# Patient Record
Sex: FEMALE | Race: WHITE | Employment: UNEMPLOYED | URBAN - METROPOLITAN AREA
[De-identification: names, ages, dates, MRNs, and addresses within clinical notes are randomized per-mention and may not be internally consistent; named-entity substitution may affect disease eponyms.]

---

## 2017-01-06 ENCOUNTER — DOCUMENTATION ONLY (OUTPATIENT)
Dept: ONCOLOGY | Age: 62
End: 2017-01-06

## 2017-01-06 NOTE — PROGRESS NOTES
[employer]  Social Work Navigator Encounter     Patient Name:  Shannon Le. Vikash Luan     Medical History: hx of breast cancer    Advance Directives:    Narrative: pt still receiving treatment ; pt taking letrozole ;  SW completed Medicaid renewal form and had oncologist sign. SW provided form back to pt- SW made copy and placed in scan folder. Barriers to Care:     Plan:   1. Provide support as needed.

## 2017-02-08 ENCOUNTER — HOSPITAL ENCOUNTER (OUTPATIENT)
Dept: MAMMOGRAPHY | Age: 62
Discharge: HOME | End: 2017-02-08
Payer: MEDICAID

## 2017-02-08 DIAGNOSIS — Z12.31 ENCOUNTER FOR SCREENING MAMMOGRAM FOR BREAST CANCER: ICD-10-CM

## 2017-02-08 PROCEDURE — 77067 SCR MAMMO BI INCL CAD: CPT

## 2017-04-12 ENCOUNTER — TELEPHONE (OUTPATIENT)
Dept: ONCOLOGY | Age: 62
End: 2017-04-12

## 2017-04-12 DIAGNOSIS — N60.99 BREAST ATYPICAL HYPERPLASIA: Primary | ICD-10-CM

## 2017-04-12 NOTE — TELEPHONE ENCOUNTER
Patient called and said she received a letter in the mail from [facility] that stated she is due for another bone density scan. She would like the order faxed to them at [phone]. Their phone number is [phone].  Please give pt a call to keep her updated about what's going on, she may be reached at [phone]

## 2017-04-12 NOTE — TELEPHONE ENCOUNTER
Returned call ,HIPAA verified by two patient identifiers,   and informed patient that order for Bone scan faxed to [address] [place] [address]

## 2017-04-20 ENCOUNTER — HOSPITAL ENCOUNTER (OUTPATIENT)
Dept: NUCLEAR MEDICINE | Age: 62
Discharge: HOME | End: 2017-04-20
Attending: INTERNAL MEDICINE
Payer: MEDICAID

## 2017-04-20 DIAGNOSIS — N60.99 BREAST ATYPICAL HYPERPLASIA: ICD-10-CM

## 2017-04-20 PROCEDURE — 78306 BONE IMAGING WHOLE BODY: CPT

## 2017-08-16 DIAGNOSIS — N60.99 LOBULAR HYPERPLASIA, ATYPICAL, BREAST: ICD-10-CM

## 2017-08-16 RX ORDER — LETROZOLE 2.5 MG/1
2.5 TABLET, FILM COATED ORAL DAILY
Qty: 30 TAB | Refills: 6 | Status: SHIPPED | OUTPATIENT
Start: 2017-08-16 | End: 2018-02-26 | Stop reason: SDUPTHER

## 2017-08-16 NOTE — TELEPHONE ENCOUNTER
PER VORB from Dr. Andrea Layne LETROZOLE 2.5MG [facility]) ONE TAB ONCE A DAY QUANTITY 30 REFILL 6.

## 2017-09-21 ENCOUNTER — OFFICE VISIT (OUTPATIENT)
Dept: ONCOLOGY | Age: 62
End: 2017-09-21

## 2017-09-21 VITALS
TEMPERATURE: 98.2 F | SYSTOLIC BLOOD PRESSURE: 115 MMHG | RESPIRATION RATE: 16 BRPM | OXYGEN SATURATION: 95 % | WEIGHT: 144.8 LBS | HEART RATE: 105 BPM | DIASTOLIC BLOOD PRESSURE: 78 MMHG | BODY MASS INDEX: 24.12 KG/M2 | HEIGHT: 65 IN

## 2017-09-21 DIAGNOSIS — M81.0 OSTEOPOROSIS, UNSPECIFIED OSTEOPOROSIS TYPE, UNSPECIFIED PATHOLOGICAL FRACTURE PRESENCE: ICD-10-CM

## 2017-09-21 DIAGNOSIS — N60.99 LOBULAR HYPERPLASIA, ATYPICAL, BREAST: Primary | ICD-10-CM

## 2017-09-21 NOTE — PROGRESS NOTES
Robbie Ennis is a 62 y.o. female here today for Atypical lobular hyperplasia of the right breast f/u. Elevated pulse. Patient denies N/V. Patient stated she has not smoked in over a year. Patient showed nurse some discolored circular areas that have been showing up on her lower arms; patient states areas will develop a scab at times.

## 2017-09-22 NOTE — PROGRESS NOTES
Follow up Note        Patient: Zahida Rodríguez MRN: [id number]  SSN: [id number]    YOB: 1955  Age: 62 y.o. Sex: female        Diagnosis:     1. Atypical lobular hyperplasia of the right breast    Treatment:     1. Excisional biopsy on 2015  2. Letrozole    Subjective:      Zahida Rodríguez is a 62 y.o. female with diagnosis of Atypical lobular hyperplasia of the right breast. She underwent an excisional biopsy on 2015 which showed residual focal atypical hyperplasia. She is letrozole and is asymptomatic.           Review of Systems:    Constitutional: negative  Eyes: negative  Ears, Nose, Mouth, Throat, and Face: negative  Respiratory: negative  Cardiovascular: negative  Gastrointestinal: negative  Integument/Breast: negative  Hematologic/Lymphatic: negative  Musculoskeletal:negative  Neurological: negative        Past Medical History:   Diagnosis Date    Atypical lobular hyperplasia of breast     right breast    [facility] [address]     R 2013, L     Diabetes ([facility] [address]     BORDERLINE BEING MONITORED BY PCP    Hypertension     Nausea & vomiting      Past Surgical History:   Procedure Laterality Date    HX BREAST BIOPSY Right 2015    RIGHT BREAST EXCISIONAL BIOPSY WITH ULTRASOUND performed by Venus Hart [facility] at [facility] AMBULATORY OR    HX GYN      TUBAL LIGATION     HX HEENT  1987    RECONSTRUCTION OF NOSE    HX HEENT      EXTRACTION OF WISDOM TEETH X 4      Family History   Problem Relation Age of Onset    Breast Cancer Mother       at 40   Grimes Bogus Cancer Mother      BREAST    Heart Disease Father     No Known Problems Sister     No Known Problems Brother     No Known Problems Daughter     No Known Problems Son     Breast Cancer Maternal Aunt      Social History   Substance Use Topics    Smoking status: Former Smoker     Packs/day: 1.00     Years: 30.00     Quit date: 2016    Smokeless tobacco: Never Used      Comment: Quit Now Brochure provided  Alcohol use No      Prior to Admission medications    Medication Sig Start Date End Date Taking? Authorizing Provider   multivitamins chew Take  by mouth daily. Yes Historical Provider   letrozole [facility]) 2.5 mg tablet Take 1 Tab by mouth daily. 8/16/17  Yes Clementine Cheadle, MD   lisinopril (PRINIVIL, ZESTRIL) 20 mg tablet Take  by mouth daily. TAKES IN AM   Yes Historical Provider   CYANOCOBALAMIN, VITAMIN B-12, (VITAMIN B-12 PO) Take 1 Tab by mouth daily. Historical Provider   HYDROcodone-acetaminophen (NORCO) 5-325 mg per tablet Take 1 Tab by mouth every four (4) hours as needed for Pain. Max Daily Amount: 6 Tabs. 2/17/15   Mara Walls MD   promethazine (PHENERGAN) 12.5 mg tablet Take 1 Tab by mouth every six (6) hours as needed for Nausea. 2/17/15   Mara Walls MD              Allergies   Allergen Reactions    Sulfa (Sulfonamide Antibiotics) Swelling    Penicillins Rash    Percocet [Oxycodone-Acetaminophen] Nausea Only           Objective:     Vitals:    09/21/17 1340   BP: 115/78   Pulse: (!) 105   Resp: 16   Temp: 98.2 °F (36.8 °C)   TempSrc: Oral   SpO2: 95%   Weight: 144 lb 12.8 oz (65.7 kg)   Height: 5' 5\" (1.651 m)            Physical Exam:    GENERAL: alert, cooperative, no distress, appears older than her age  EYE: negative  LYMPHATIC: Cervical, supraclavicular, and axillary nodes normal.   THROAT & NECK: normal and no erythema or exudates noted. LUNG: clear to auscultation bilaterally  HEART: regular rate and rhythm  ABDOMEN: soft, non-tender  EXTREMITIES: no cyanosis or edema  SKIN: Normal.  NEUROLOGIC: negative            Assessment:     1. Atypical lobular hyperplasia of the right breast    S/P excisional biopsy 02/2015  On Letrozole for risk reduction  Asymptomatic      2. Osteoprosis    Zoledronic acid        Treatment:       1. Continuie Letrozole 2.5 mg/day  2. F/U in 1 yr         Signed By: Clementine Cheadle, MD     September 21, 2017           CC. Paola Burnett MD  CC.  Jean Spatz Mitzi Severs, MD

## 2018-02-15 ENCOUNTER — HOSPITAL ENCOUNTER (OUTPATIENT)
Dept: MAMMOGRAPHY | Age: 63
Discharge: HOME | End: 2018-02-15
Attending: FAMILY MEDICINE
Payer: MEDICAID

## 2018-02-15 DIAGNOSIS — Z12.31 VISIT FOR SCREENING MAMMOGRAM: ICD-10-CM

## 2018-02-15 PROCEDURE — 77067 SCR MAMMO BI INCL CAD: CPT

## 2018-02-26 DIAGNOSIS — N60.99 LOBULAR HYPERPLASIA, ATYPICAL, BREAST: ICD-10-CM

## 2018-02-26 RX ORDER — LETROZOLE 2.5 MG/1
2.5 TABLET, FILM COATED ORAL DAILY
Qty: 30 TAB | Refills: 6 | Status: SHIPPED | OUTPATIENT
Start: 2018-02-26 | End: 2018-09-27 | Stop reason: SDUPTHER

## 2018-02-26 NOTE — TELEPHONE ENCOUNTER
PER JASMINA from Dr. Monika Mccain LETROZOLE 2.5MG [facility]) ONE TAB ONCE A DAY QUANTITY 30 REFILL 6.

## 2018-05-10 RX ORDER — ZOLEDRONIC ACID 5 MG/100ML
5 INJECTION, SOLUTION INTRAVENOUS ONCE
Status: COMPLETED | OUTPATIENT
Start: 2018-05-15 | End: 2018-05-15

## 2018-05-15 ENCOUNTER — HOSPITAL ENCOUNTER (OUTPATIENT)
Dept: INFUSION THERAPY | Age: 63
Discharge: HOME | End: 2018-05-15
Payer: MEDICAID

## 2018-05-15 VITALS
WEIGHT: 137.13 LBS | HEART RATE: 84 BPM | OXYGEN SATURATION: 96 % | HEIGHT: 64 IN | RESPIRATION RATE: 16 BRPM | BODY MASS INDEX: 23.41 KG/M2 | TEMPERATURE: 98.3 F | SYSTOLIC BLOOD PRESSURE: 119 MMHG | DIASTOLIC BLOOD PRESSURE: 78 MMHG

## 2018-05-15 LAB
ANION GAP BLD CALC-SCNC: 16 MMOL/L (ref 10–20)
BUN BLD-MCNC: 9 MG/DL (ref 9–20)
CA-I BLD-MCNC: 1.18 MMOL/L (ref 1.12–1.32)
CHLORIDE BLD-SCNC: 102 MMOL/L (ref 98–107)
CO2 BLD-SCNC: 26 MMOL/L (ref 21–32)
CREAT BLD-MCNC: 0.5 MG/DL (ref 0.6–1.3)
GLUCOSE BLD-MCNC: 155 MG/DL (ref 65–100)
HCT VFR BLD CALC: 41 % (ref 35–47)
POTASSIUM BLD-SCNC: 3.7 MMOL/L (ref 3.5–5.1)
SERVICE CMNT-IMP: ABNORMAL
SODIUM BLD-SCNC: 140 MMOL/L (ref 136–145)

## 2018-05-15 PROCEDURE — 74011250636 HC RX REV CODE- 250/636: Performed by: NURSE PRACTITIONER

## 2018-05-15 PROCEDURE — 80047 BASIC METABLC PNL IONIZED CA: CPT

## 2018-05-15 PROCEDURE — 96374 THER/PROPH/DIAG INJ IV PUSH: CPT

## 2018-05-15 RX ORDER — SODIUM CHLORIDE 0.9 % (FLUSH) 0.9 %
10-40 SYRINGE (ML) INJECTION AS NEEDED
Status: ACTIVE | OUTPATIENT
Start: 2018-05-15 | End: 2018-05-16

## 2018-05-15 RX ADMIN — Medication 10 ML: at 10:41

## 2018-05-15 RX ADMIN — ZOLEDRONIC ACID 5 MG: 5 INJECTION, SOLUTION INTRAVENOUS at 10:54

## 2018-05-15 RX ADMIN — Medication 10 ML: at 11:12

## 2018-05-15 NOTE — PROGRESS NOTES
1005 Pt arrived at [place] ambulatory and in no distress for Reclast.  Assessment unremarkable, no new complaints voiced. PIV established in R arm WNL and positive for blood return. Patient Vitals for the past 12 hrs:   Temp Pulse Resp BP SpO2   05/15/18 1111 - 84 16 119/78 -   05/15/18 1011 98.3 °F (36.8 °C) 86 18 131/80 96 %       Labs:   Recent Results (from the past 12 hour(s))   POC CHEM8    Collection Time: 05/15/18 10:23 AM   Result Value Ref Range    Calcium, ionized (POC) 1.18 1.12 - 1.32 mmol/L    Sodium (POC) 140 136 - 145 mmol/L    Potassium (POC) 3.7 3.5 - 5.1 mmol/L    Chloride (POC) 102 98 - 107 mmol/L    CO2 (POC) 26 21 - 32 mmol/L    Anion gap (POC) 16 10 - 20 mmol/L    Glucose (POC) 155 (H) 65 - 100 mg/dL    BUN (POC) 9 9 - 20 mg/dL    Creatinine (POC) 0.5 (L) 0.6 - 1.3 mg/dL    GFRAA, POC >60 >60 ml/min/1.73m2    GFRNA, POC >60 >60 ml/min/1.73m2    Hematocrit (POC) 41 35.0 - 47.0 %    Comment Notified RN or MD immediately by          Medications received:  NS Flush  Reclast 5 mg IV over 15 min    1115 Tolerated treatment well, no adverse reaction noted. D/Cd from [facility] and in no distress accompanied by self.   Next appt 5/14/19 @ 10 am.

## 2018-09-17 ENCOUNTER — DOCUMENTATION ONLY (OUTPATIENT)
Dept: ONCOLOGY | Age: 63
End: 2018-09-17

## 2018-09-17 NOTE — PROGRESS NOTES
Called pt to reschedule 9/21/18 appt, as Dr Riaz Mac needs to be out of the office.   Left message for pt to return call

## 2018-09-27 ENCOUNTER — OFFICE VISIT (OUTPATIENT)
Dept: ONCOLOGY | Age: 63
End: 2018-09-27

## 2018-09-27 VITALS
HEART RATE: 79 BPM | TEMPERATURE: 98.6 F | OXYGEN SATURATION: 93 % | SYSTOLIC BLOOD PRESSURE: 117 MMHG | BODY MASS INDEX: 23.01 KG/M2 | HEIGHT: 64 IN | WEIGHT: 134.8 LBS | DIASTOLIC BLOOD PRESSURE: 81 MMHG | RESPIRATION RATE: 18 BRPM

## 2018-09-27 DIAGNOSIS — M81.0 OSTEOPOROSIS, UNSPECIFIED OSTEOPOROSIS TYPE, UNSPECIFIED PATHOLOGICAL FRACTURE PRESENCE: ICD-10-CM

## 2018-09-27 DIAGNOSIS — N60.99 ATYPICAL LOBULAR HYPERPLASIA OF BREAST: Primary | ICD-10-CM

## 2018-09-27 DIAGNOSIS — N60.99 LOBULAR HYPERPLASIA, ATYPICAL, BREAST: ICD-10-CM

## 2018-09-27 RX ORDER — LETROZOLE 2.5 MG/1
2.5 TABLET, FILM COATED ORAL DAILY
Qty: 90 TAB | Refills: 4 | Status: SHIPPED | OUTPATIENT
Start: 2018-09-27 | End: 2019-10-08 | Stop reason: SDUPTHER

## 2018-09-27 NOTE — PROGRESS NOTES
Isaak Gonzalez is a 65 y.o. female    Chief Complaint   Patient presents with    Follow-up    Medication Refill       1. Have you been to the ER, urgent care clinic since your last visit? Hospitalized since your last visit? No    2. Have you seen or consulted any other health care providers outside of the [address] [place] [address] since your last visit? Include any pap smears or colon screening.  No

## 2018-09-27 NOTE — TELEPHONE ENCOUNTER
PER JASMINA from Dr. Coronado Pacific LETROZOLE 2.5MG [facility]) ONE TAB ONCE A DAY QUANTITY 90 REFILL 4.

## 2018-10-20 NOTE — PROGRESS NOTES
Follow up Note        Patient: Alysia Cruz MRN: [id number]  SSN: [id number]    YOB: 1955  Age: 65 y.o. Sex: female        Diagnosis:     1. Atypical lobular hyperplasia of the right breast    Treatment:     1. Excisional biopsy on 2015  2. Letrozole    Subjective:      Alysia Cruz is a 65 y.o. female with diagnosis of Atypical lobular hyperplasia of the right breast. She underwent an excisional biopsy on 2015 which showed residual focal atypical hyperplasia. She is letrozole and is asymptomatic.           Review of Systems:    Constitutional: negative  Eyes: negative  Ears, Nose, Mouth, Throat, and Face: negative  Respiratory: negative  Cardiovascular: negative  Gastrointestinal: negative  Integument/Breast: negative  Hematologic/Lymphatic: negative  Musculoskeletal:negative  Neurological: negative        Past Medical History:   Diagnosis Date    Atypical lobular hyperplasia of breast     right breast    Cancer ([facility] [address]     R 2013, L     Diabetes ([facility] [address]     BORDERLINE BEING MONITORED BY PCP    Hypertension     Nausea & vomiting      Past Surgical History:   Procedure Laterality Date    HX GYN      TUBAL LIGATION     HX HEENT  1987    RECONSTRUCTION OF NOSE    HX HEENT      EXTRACTION OF WISDOM TEETH X 4      Family History   Problem Relation Age of Onset    Breast Cancer Mother          at 40   [facility] Cancer Mother         BREAST    Heart Disease Father     No Known Problems Sister     No Known Problems Brother     No Known Problems Daughter     No Known Problems Son     Breast Cancer Maternal Aunt      Social History     Tobacco Use    Smoking status: Former Smoker     Packs/day: 1.00     Years: 30.00     Pack years: 30.00     Last attempt to quit: 2016     Years since quittin.0    Smokeless tobacco: Never Used    Tobacco comment: Quit Now Brochure provided   Substance Use Topics    Alcohol use: No      Prior to Admission medications    Medication Sig Start Date End Date Taking? Authorizing Provider   multivitamins chew Take  by mouth daily. Yes Provider, Historical   lisinopril (PRINIVIL, ZESTRIL) 20 mg tablet Take  by mouth daily. TAKES IN AM   Yes Provider, Historical   letrozole (FEMARA) 2.5 mg tablet Take 1 Tab by mouth daily. 9/27/18   lEoy Ellison MD   CYANOCOBALAMIN, VITAMIN B-12, (VITAMIN B-12 PO) Take 1 Tab by mouth daily. Provider, Historical   HYDROcodone-acetaminophen (NORCO) 5-325 mg per tablet Take 1 Tab by mouth every four (4) hours as needed for Pain. Max Daily Amount: 6 Tabs. 2/17/15   Vergia Mortimer, MD   promethazine (PHENERGAN) 12.5 mg tablet Take 1 Tab by mouth every six (6) hours as needed for Nausea. 2/17/15   Vergia Mortimer, MD              Allergies   Allergen Reactions    Sulfa (Sulfonamide Antibiotics) Swelling    Penicillins Rash    Percocet [Oxycodone-Acetaminophen] Nausea Only           Objective:     Vitals:    09/27/18 1517   BP: 117/81   Pulse: 79   Resp: 18   Temp: 98.6 °F (37 °C)   TempSrc: Oral   SpO2: 93%   Weight: 134 lb 12.8 oz (61.1 kg)   Height: 5' 4\" (1.626 m)            Physical Exam:    GENERAL: alert, cooperative, no distress, appears older than her age  EYE: negative  LYMPHATIC: Cervical, supraclavicular, and axillary nodes normal.   THROAT & NECK: normal and no erythema or exudates noted. LUNG: clear to auscultation bilaterally  HEART: regular rate and rhythm  ABDOMEN: soft, non-tender  EXTREMITIES: no cyanosis or edema  SKIN: Normal.  NEUROLOGIC: negative            Assessment:     1. Atypical lobular hyperplasia of the right breast    S/P excisional biopsy 02/2015  On Letrozole for risk reduction  Asymptomatic      2. Osteoprosis    Zoledronic acid        Treatment:       1. Continuie Letrozole 2.5 mg/day  2. F/U in 1 yr         Signed By: Giovana Yeboah MD     October 20, 2018           CC. Marshall Adler MD  CC.  Jeremy Barker MD

## 2019-01-07 ENCOUNTER — HOSPITAL ENCOUNTER (OUTPATIENT)
Dept: ULTRASOUND IMAGING | Age: 64
Discharge: HOME | End: 2019-01-07
Attending: ORTHOPAEDIC SURGERY
Payer: MEDICAID

## 2019-01-07 DIAGNOSIS — M62.432 CONTRACTURE OF MUSCLE OF LEFT FOREARM: ICD-10-CM

## 2019-01-07 PROCEDURE — 76882 US LMTD JT/FCL EVL NVASC XTR: CPT

## 2019-01-25 ENCOUNTER — TELEPHONE (OUTPATIENT)
Dept: ONCOLOGY | Age: 64
End: 2019-01-25

## 2019-02-19 ENCOUNTER — HOSPITAL ENCOUNTER (OUTPATIENT)
Dept: MAMMOGRAPHY | Age: 64
Discharge: HOME | End: 2019-02-19
Attending: FAMILY MEDICINE
Payer: MEDICAID

## 2019-02-19 DIAGNOSIS — Z12.31 VISIT FOR SCREENING MAMMOGRAM: ICD-10-CM

## 2019-02-19 PROCEDURE — 77067 SCR MAMMO BI INCL CAD: CPT

## 2019-05-14 ENCOUNTER — HOSPITAL ENCOUNTER (OUTPATIENT)
Dept: INFUSION THERAPY | Age: 64
Discharge: HOME | End: 2019-05-14
Payer: MEDICAID

## 2019-05-14 VITALS
WEIGHT: 140.7 LBS | RESPIRATION RATE: 16 BRPM | HEIGHT: 64 IN | DIASTOLIC BLOOD PRESSURE: 80 MMHG | HEART RATE: 67 BPM | TEMPERATURE: 98.7 F | SYSTOLIC BLOOD PRESSURE: 134 MMHG | BODY MASS INDEX: 24.02 KG/M2 | OXYGEN SATURATION: 94 %

## 2019-05-14 DIAGNOSIS — M81.0 OSTEOPOROSIS, UNSPECIFIED OSTEOPOROSIS TYPE, UNSPECIFIED PATHOLOGICAL FRACTURE PRESENCE: Primary | ICD-10-CM

## 2019-05-14 PROBLEM — M85.80 OSTEOPENIA: Status: ACTIVE | Noted: 2019-05-14

## 2019-05-14 LAB
ANION GAP BLD CALC-SCNC: 16 MMOL/L (ref 10–20)
BUN BLD-MCNC: 12 MG/DL (ref 9–20)
CA-I BLD-MCNC: 1.31 MMOL/L (ref 1.12–1.32)
CHLORIDE BLD-SCNC: 100 MMOL/L (ref 98–107)
CO2 BLD-SCNC: 27 MMOL/L (ref 21–32)
CREAT BLD-MCNC: 0.6 MG/DL (ref 0.6–1.3)
GLUCOSE BLD-MCNC: 100 MG/DL (ref 65–100)
HCT VFR BLD CALC: 46 % (ref 35–47)
POTASSIUM BLD-SCNC: 3.7 MMOL/L (ref 3.5–5.1)
SERVICE CMNT-IMP: NORMAL
SODIUM BLD-SCNC: 139 MMOL/L (ref 136–145)

## 2019-05-14 PROCEDURE — 74011250636 HC RX REV CODE- 250/636: Performed by: NURSE PRACTITIONER

## 2019-05-14 PROCEDURE — 80047 BASIC METABLC PNL IONIZED CA: CPT

## 2019-05-14 PROCEDURE — 74011000258 HC RX REV CODE- 258: Performed by: INTERNAL MEDICINE

## 2019-05-14 PROCEDURE — 96374 THER/PROPH/DIAG INJ IV PUSH: CPT

## 2019-05-14 RX ORDER — ACETAMINOPHEN 325 MG/1
650 TABLET ORAL AS NEEDED
Status: CANCELLED
Start: 2019-05-14

## 2019-05-14 RX ORDER — DIPHENHYDRAMINE HYDROCHLORIDE 50 MG/ML
50 INJECTION, SOLUTION INTRAMUSCULAR; INTRAVENOUS AS NEEDED
Status: CANCELLED
Start: 2019-05-14

## 2019-05-14 RX ORDER — EPINEPHRINE 1 MG/ML
0.3 INJECTION, SOLUTION, CONCENTRATE INTRAVENOUS AS NEEDED
Status: CANCELLED | OUTPATIENT
Start: 2019-05-14

## 2019-05-14 RX ORDER — SODIUM CHLORIDE 9 MG/ML
10 INJECTION INTRAMUSCULAR; INTRAVENOUS; SUBCUTANEOUS AS NEEDED
Status: ACTIVE | OUTPATIENT
Start: 2019-05-14 | End: 2019-05-14

## 2019-05-14 RX ORDER — SODIUM CHLORIDE 9 MG/ML
25 INJECTION, SOLUTION INTRAVENOUS AS NEEDED
Status: DISCONTINUED | OUTPATIENT
Start: 2019-05-14 | End: 2019-05-15 | Stop reason: HOSPADM

## 2019-05-14 RX ORDER — ONDANSETRON 2 MG/ML
8 INJECTION INTRAMUSCULAR; INTRAVENOUS AS NEEDED
Status: CANCELLED | OUTPATIENT
Start: 2019-05-14

## 2019-05-14 RX ORDER — ZOLEDRONIC ACID 5 MG/100ML
5 INJECTION, SOLUTION INTRAVENOUS ONCE
Status: COMPLETED | OUTPATIENT
Start: 2019-05-14 | End: 2019-05-14

## 2019-05-14 RX ORDER — HEPARIN 100 UNIT/ML
300-500 SYRINGE INTRAVENOUS AS NEEDED
Status: CANCELLED
Start: 2019-05-14

## 2019-05-14 RX ORDER — SODIUM CHLORIDE 0.9 % (FLUSH) 0.9 %
10-40 SYRINGE (ML) INJECTION AS NEEDED
Status: DISCONTINUED | OUTPATIENT
Start: 2019-05-14 | End: 2019-05-15 | Stop reason: HOSPADM

## 2019-05-14 RX ORDER — SODIUM CHLORIDE 9 MG/ML
25 INJECTION, SOLUTION INTRAVENOUS CONTINUOUS
Status: DISPENSED | OUTPATIENT
Start: 2019-05-14 | End: 2019-05-14

## 2019-05-14 RX ORDER — SODIUM CHLORIDE 9 MG/ML
10 INJECTION INTRAMUSCULAR; INTRAVENOUS; SUBCUTANEOUS AS NEEDED
Status: CANCELLED | OUTPATIENT
Start: 2019-05-14

## 2019-05-14 RX ORDER — HEPARIN 100 UNIT/ML
300-500 SYRINGE INTRAVENOUS AS NEEDED
Status: ACTIVE | OUTPATIENT
Start: 2019-05-14 | End: 2019-05-14

## 2019-05-14 RX ORDER — SODIUM CHLORIDE 0.9 % (FLUSH) 0.9 %
10 SYRINGE (ML) INJECTION AS NEEDED
Status: ACTIVE | OUTPATIENT
Start: 2019-05-14 | End: 2019-05-14

## 2019-05-14 RX ORDER — ALBUTEROL SULFATE 0.83 MG/ML
2.5 SOLUTION RESPIRATORY (INHALATION) AS NEEDED
Status: CANCELLED
Start: 2019-05-14

## 2019-05-14 RX ORDER — SODIUM CHLORIDE 9 MG/ML
25 INJECTION, SOLUTION INTRAVENOUS CONTINUOUS
Status: CANCELLED | OUTPATIENT
Start: 2019-05-14

## 2019-05-14 RX ORDER — SODIUM CHLORIDE 0.9 % (FLUSH) 0.9 %
10 SYRINGE (ML) INJECTION AS NEEDED
Status: CANCELLED
Start: 2019-05-14

## 2019-05-14 RX ORDER — HYDROCORTISONE SODIUM SUCCINATE 100 MG/2ML
100 INJECTION, POWDER, FOR SOLUTION INTRAMUSCULAR; INTRAVENOUS AS NEEDED
Status: CANCELLED | OUTPATIENT
Start: 2019-05-14

## 2019-05-14 RX ADMIN — SODIUM CHLORIDE 25 ML/HR: 900 INJECTION, SOLUTION INTRAVENOUS at 10:25

## 2019-05-14 RX ADMIN — Medication 10 ML: at 10:16

## 2019-05-14 RX ADMIN — ZOLEDRONIC ACID 5 MG: 5 INJECTION, SOLUTION INTRAVENOUS at 10:27

## 2019-05-14 RX ADMIN — Medication 10 ML: at 10:48

## 2019-05-14 NOTE — PROGRESS NOTES
2019 Pt arrived at [facility] ambulatory and in no distress for Reclast.  Assessment unremarkable, no new complaints voiced. Patient Vitals for the past 12 hrs:   Temp Pulse Resp BP SpO2   05/14/19 1049 -- 67 16 134/80 --   05/14/19 0956 98.7 °F (37.1 °C) 78 18 148/88 94 %       Medications received:  NS KVO  Reclast 5 mg IV over 15 min  NS Flush    1055 Tolerated treatment well, no adverse reaction noted. D/Cd from [facility] ambulatory and in no distress accompanied by Self. Next appointment will be after pt follows up with physician in a year.

## 2019-05-14 NOTE — DISCHARGE INSTRUCTIONS
OUTPATIENT INFUSION CENTER    DISCHARGE INSTRUCTIONS FOR:  RECLAST (ZOLEDRONIC ACID):    1.  Be sure to inform your Dentist that you are taking this drug prior to invasive dental procedures. You should avoid major dental work for a while after you are treated with this medicine. Report any signs/symptoms of jaw pain to your physician immediately. 2.  Your doctor may order lab testing before each yearly dose of Reclast to check your calcium and kidney function. Your doctor may also prescribe Vitamin D and Calcium supplements. 3.  Make sure your doctor knows if you are taking fluid pills, arthritis medicines or antibiotics,  or if you have a history of problems with your gums, mouth or teeth. 4.  Drink some extra fluids during the 12-hour period before and after you receive Reclast.  Report any changes in urinary patterns (example: a decrease in how often you urinate). Also report rapid weight gain, swelling of the hands, ankles or feet, unusual tiredness or weakness or unusual bleeding or bruising. 5.  You may resume your normal activities after your infusion. Some people may have mild flu-like side effects from Reclast, especially with the first dose. These side effects are less common with subsequent doses. These may include:    Mild nausea, diarrhea, constipation or upset stomach; Red or irritated eyes; redness or itching at IV site;  Mild skin rash, mild numbness, tingling, or burning in extremities; Headache, dizziness, trouble sleeping, or mild muscle or joint pain, which can be relieved with a mild pain reliever such as Tyenol or Ibuprofen as directed by your physician;  Nasal congestion, runny nose, sneezing. 6.  Signs/Symptoms of an allergic reaction may require immediate medical attention. These are rare, but may include one or more of the following:     Skin - Swelling, blistering, weeping, crusting, rash, itching or;   Wheezing, cough, sore throat, chest tightness, chest pain or shortness of breath, irregular heart beat;  Swelling of the face, eyelids, lips, tongue, or throat; severe dry mouth; Severe red (bloodshot), itchy, swollen, watery or painful eyes;  Stomach pain, loss of appetite, nausea, vomiting, or bloody diarrhea;  Severe headache, sleepiness or changes in personality;  Numbness, tingling or pain around the mouth, teeth, or jaw. Contact your physician if you have questions or concerns, or experience any of the above symptoms.     Deborah Vazquez, Signature: ______________________________ 5/14/2019  Michele Gregory RN

## 2019-09-27 ENCOUNTER — OFFICE VISIT (OUTPATIENT)
Dept: ONCOLOGY | Age: 64
End: 2019-09-27

## 2019-09-27 VITALS
TEMPERATURE: 98.2 F | BODY MASS INDEX: 23.63 KG/M2 | HEART RATE: 82 BPM | DIASTOLIC BLOOD PRESSURE: 77 MMHG | HEIGHT: 64 IN | WEIGHT: 138.4 LBS | OXYGEN SATURATION: 94 % | RESPIRATION RATE: 16 BRPM | SYSTOLIC BLOOD PRESSURE: 135 MMHG

## 2019-09-27 DIAGNOSIS — N60.99 LOBULAR HYPERPLASIA, ATYPICAL, BREAST: Primary | ICD-10-CM

## 2019-09-27 DIAGNOSIS — M81.8 OTHER OSTEOPOROSIS WITHOUT CURRENT PATHOLOGICAL FRACTURE: ICD-10-CM

## 2019-09-27 DIAGNOSIS — Z79.811 AROMATASE INHIBITOR USE: ICD-10-CM

## 2019-09-27 NOTE — PROGRESS NOTES
58 y/o cauc female here for f/u appt for breast ca, right, is letrozole as of 2/2015. Zometa 5/2019.    1. Have you been to the ER, urgent care clinic since your last visit? Hospitalized since your last visit? No    2. Have you seen or consulted any other health care providers outside of the [address] [place] [address] since your last visit? Include any pap smears or colon screening.  Yes PCP for BP medication refill

## 2019-09-27 NOTE — PROGRESS NOTES
[facility]  [address], [address] [place] [address] Chad Cárdenasu, [address] [facility]  [phone]      Follow up Note        Patient: Stone Katz MRN: [id number]  SSN: [id number]    YOB: 1955  Age: 59 y.o. Sex: female        Diagnosis:     1. Atypical lobular hyperplasia of the right breast Dx: 2015    Treatment:     1. Excisional biopsy on 2015  2. Letrozole 2.5 mg - started 3/2015    Subjective:      Stone Katz is a 59 y.o. female with diagnosis of Atypical lobular hyperplasia of the right breast. She underwent an excisional biopsy on 2015 which showed residual focal atypical hyperplasia. She is taking letrozole and is asymptomatic.         Review of Systems:    Constitutional: negative  Eyes: negative  Ears, Nose, Mouth, Throat, and Face: negative  Respiratory: negative  Cardiovascular: negative  Gastrointestinal: negative  Integument/Breast: negative  Hematologic/Lymphatic: negative  Musculoskeletal:negative  Neurological: negative        Past Medical History:   Diagnosis Date    Atypical lobular hyperplasia of breast     right breast    Cancer ([facility] [address]     R 2013, L     Diabetes ([facility] [address]     BORDERLINE BEING MONITORED BY PCP    Hypertension     Nausea & vomiting      Past Surgical History:   Procedure Laterality Date    HX BREAST BIOPSY Right 2015    ADH    HX GYN      TUBAL LIGATION     HX HEENT  1987    RECONSTRUCTION OF NOSE    HX HEENT      EXTRACTION OF WISDOM TEETH X 4      Family History   Problem Relation Age of Onset    Breast Cancer Mother          at 40   [place] Cancer Mother         BREAST    Heart Disease Father     No Known Problems Sister     No Known Problems Brother     No Known Problems Daughter     No Known Problems Son     Breast Cancer Maternal Aunt      Social History     Tobacco Use    Smoking status: Former Smoker     Packs/day: 1.00     Years: 30.00 Pack years: 30.00     Last attempt to quit: 9/21/2016     Years since quitting: 3.0    Smokeless tobacco: Never Used    Tobacco comment: Quit Now Brochure provided   Substance Use Topics    Alcohol use: No      Prior to Admission medications    Medication Sig Start Date End Date Taking? Authorizing Provider   letrozole [facility]) 2.5 mg tablet Take 1 Tab by mouth daily. 9/27/18  Yes Raymon Dakin, MD   multivitamins chew Take  by mouth daily. Yes Provider, Historical   lisinopril (PRINIVIL, ZESTRIL) 20 mg tablet Take  by mouth daily. TAKES IN AM   Yes Provider, Historical   CYANOCOBALAMIN, VITAMIN B-12, (VITAMIN B-12 PO) Take 1 Tab by mouth daily. Provider, Historical   HYDROcodone-acetaminophen (NORCO) 5-325 mg per tablet Take 1 Tab by mouth every four (4) hours as needed for Pain. Max Daily Amount: 6 Tabs. 2/17/15   Mark Tiwari MD   promethazine (PHENERGAN) 12.5 mg tablet Take 1 Tab by mouth every six (6) hours as needed for Nausea. 2/17/15   Mark Tiwari MD              Allergies   Allergen Reactions    Sulfa (Sulfonamide Antibiotics) Swelling    Penicillins Rash    Percocet [Oxycodone-Acetaminophen] Nausea Only           Objective:     Vitals:    09/27/19 1058   BP: 135/77   Pulse: 82   Resp: 16   Temp: 98.2 °F (36.8 °C)   TempSrc: Oral   SpO2: 94%   Weight: 138 lb 6.4 oz (62.8 kg)   Height: 5' 4\" (1.626 m)            Physical Exam:    GENERAL: alert, cooperative, no distress, appears older than her age  EYE: negative  LYMPHATIC: Cervical, supraclavicular, and axillary nodes normal.   THROAT & NECK: normal and no erythema or exudates noted. LUNG: clear to auscultation bilaterally  HEART: regular rate and rhythm  ABDOMEN: soft, non-tender  EXTREMITIES: no cyanosis or edema  SKIN: Normal.  NEUROLOGIC: negative            Assessment:     1.  Atypical lobular hyperplasia of the right breast    S/P excisional biopsy 02/2015  On Letrozole for risk reduction  Asymptomatic    Last mammogram 2/19/2019 - no evidence of malignancy      2. Osteoprosis    Zoledronic acid      Treatment:       1. Continuie Letrozole 2.5 mg/day  2. Continue Zoledronic acid  3. F/U in 1 yr       Signed by: Lloyd Lanier MD                     September 28, 2019        CC. Emma Simons MD  CC.  Purnima Kay MD

## 2019-12-16 ENCOUNTER — TELEPHONE (OUTPATIENT)
Dept: ONCOLOGY | Age: 64
End: 2019-12-16

## 2020-02-20 ENCOUNTER — HOSPITAL ENCOUNTER (OUTPATIENT)
Dept: MAMMOGRAPHY | Age: 65
Discharge: HOME | End: 2020-02-20
Attending: FAMILY MEDICINE
Payer: MEDICAID

## 2020-02-20 DIAGNOSIS — Z12.31 VISIT FOR SCREENING MAMMOGRAM: ICD-10-CM

## 2020-02-20 PROCEDURE — 77067 SCR MAMMO BI INCL CAD: CPT

## 2020-09-28 ENCOUNTER — OFFICE VISIT (OUTPATIENT)
Dept: ONCOLOGY | Age: 65
End: 2020-09-28
Payer: MEDICAID

## 2020-09-28 VITALS
DIASTOLIC BLOOD PRESSURE: 77 MMHG | SYSTOLIC BLOOD PRESSURE: 127 MMHG | BODY MASS INDEX: 23.66 KG/M2 | HEIGHT: 64 IN | WEIGHT: 138.6 LBS | OXYGEN SATURATION: 91 % | TEMPERATURE: 98.1 F | HEART RATE: 82 BPM

## 2020-09-28 DIAGNOSIS — Z79.811 AROMATASE INHIBITOR USE: ICD-10-CM

## 2020-09-28 DIAGNOSIS — N60.99 ATYPICAL LOBULAR HYPERPLASIA OF BREAST: Primary | ICD-10-CM

## 2020-09-28 PROCEDURE — 99213 OFFICE O/P EST LOW 20 MIN: CPT | Performed by: INTERNAL MEDICINE

## 2020-09-28 NOTE — PROGRESS NOTES
Salma Nichols is a 62 y.o. female here for follow up for:  Chief Complaint   Patient presents with    Follow-up     lobular hyperplasia of right breast   Pt on letrozole    1. Have you been to the ER, urgent care clinic since your last visit? Hospitalized since your last visit? no    2. Have you seen or consulted any other health care providers outside of the [address] [place] [address] since your last visit? Include any pap smears or colon screening. Dermatologist in February    Pt has not taken Letrozole for a few months. Did not call in to ask for refill. Thought she might get taken off. She had a basal spot removed from her left arm in February. Has not had Zometa in over one year.

## 2020-09-28 NOTE — LETTER
9/28/20 Patient: Wilmar Robbins YOB: 1955 Date [facility]: [facility]28/[facility] [address] VIA Facsimile: [phone] Dear Fuentes Lawrence MD, Thank you for referring Ms. Jose Espinoza to [address] [place] [address] for evaluation. My notes for this consultation are attached. If you have questions, please do not hesitate to call me. I look forward to following your patient along with you.  
 
 
Sincerely, 
 
Piper Mckeon MD

## 2020-09-28 NOTE — PROGRESS NOTES
[facility]  [address] [facility], [address] [place] [address] Chad Moraes, [address] [facility]  [phone]      Follow up Note        Patient: Vinh Carlson MRN: [id number]  SSN: [id number]    YOB: 1955  Age: 62 y.o. Sex: female        Diagnosis:     1. Atypical lobular hyperplasia of the right breast Dx: 2015    Treatment:     1. Excisional biopsy on 2015  2. Letrozole 2.5 mg - started 3/2015 - stop 2020 completed 5 years    Subjective:      Vinh Carlson is a 62 y.o. female with diagnosis of Atypical lobular hyperplasia of the right breast. She underwent an excisional biopsy on 2015 which showed residual focal atypical hyperplasia. She recently stopped her letrozole. Mammograms up to date.      Review of Systems:    Constitutional: negative  Eyes: negative  Ears, Nose, Mouth, Throat, and Face: negative  Respiratory: negative  Cardiovascular: negative  Gastrointestinal: negative  Integument/Breast: negative  Hematologic/Lymphatic: negative  Musculoskeletal:negative  Neurological: negative        Past Medical History:   Diagnosis Date    Atypical lobular hyperplasia of breast     right breast    Cancer ([address]     R 2013, L     Diabetes ([address]     BORDERLINE BEING MONITORED BY PCP    Hypertension     Nausea & vomiting      Past Surgical History:   Procedure Laterality Date    HX BREAST BIOPSY Right 2015    ADH    HX GYN      TUBAL LIGATION     HX HEENT  1987    RECONSTRUCTION OF NOSE    HX HEENT      EXTRACTION OF WISDOM TEETH X 4      Family History   Problem Relation Age of Onset    Breast Cancer Mother          at 40   Fidelia Abbot Cancer Mother         BREAST    Heart Disease Father     No Known Problems Sister     No Known Problems Brother     No Known Problems Daughter     No Known Problems Son     Breast Cancer Maternal Aunt      Social History     Tobacco Use    Smoking status: Former Smoker     Packs/day: 1.00     Years: 30.00     Pack years: 30.00     Last attempt to quit: 2016     Years since quittin.0    Smokeless tobacco: Never Used    Tobacco comment: Quit Now Brochure provided   Substance Use Topics    Alcohol use: No      Prior to Admission medications    Medication Sig Start Date End Date Taking? Authorizing Provider   letrozole [facility]) 2.5 mg tablet TAKE ONE TABLET BY MOUTH DAILY 10/15/19  Yes Az Johnson MD   multivitamins chew Take  by mouth daily. Yes Provider, Historical   CYANOCOBALAMIN, VITAMIN B-12, (VITAMIN B-12 PO) Take 1 Tab by mouth daily. Yes Provider, Historical   lisinopril (PRINIVIL, ZESTRIL) 20 mg tablet Take  by mouth daily. TAKES IN AM   Yes Provider, Historical   HYDROcodone-acetaminophen (NORCO) 5-325 mg per tablet Take 1 Tab by mouth every four (4) hours as needed for Pain. Max Daily Amount: 6 Tabs. 2/17/15   Meek Manjarrez MD   promethazine (PHENERGAN) 12.5 mg tablet Take 1 Tab by mouth every six (6) hours as needed for Nausea. 2/17/15   Meek Manjarrez MD          Allergies   Allergen Reactions    Sulfa (Sulfonamide Antibiotics) Swelling    Penicillins Rash    Percocet [Oxycodone-Acetaminophen] Nausea Only           Objective:     Vitals:    20 1121   BP: 127/77   Pulse: 82   Temp: 98.1 °F (36.7 °C)   TempSrc: Oral   SpO2: 91%   Weight: 138 lb 9.6 oz (62.9 kg)   Height: 5' 4\" (1.626 m)        Pain Scale: 0 - No pain/10      Physical Exam:    GENERAL: alert, cooperative, no distress, appears older than her age  EYE: negative  LYMPHATIC: Cervical, supraclavicular, and axillary nodes normal.   THROAT & NECK: normal and no erythema or exudates noted. LUNG: clear to auscultation bilaterally  HEART: regular rate and rhythm  ABDOMEN: soft, non-tender  EXTREMITIES: no cyanosis or edema  SKIN: Normal.  NEUROLOGIC: negative      Assessment:     1.  Atypical lobular hyperplasia of the right breast    S/P excisional biopsy 2015  On Letrozole for risk reduction - may discontinue. She completed 5 years  Asymptomatic    Last mammogram 2/20/2020 - no evidence of malignancy      2. Osteoprosis    Zoledronic acid 2015 - 2019      Treatment:       1. Discontinue Letrozole 2.5 mg/day - completed 5 years  2. Mammogram in February 2020  3. Return as needed   4. F/U with Dr. Fredi Rodriguez      Signed by: Piper Mckeon MD                     September 28, 2020        CC. Keira Gomez MD  CC.  Adam Santos MD

## 2020-10-07 ENCOUNTER — OFFICE VISIT (OUTPATIENT)
Dept: SURGERY | Age: 65
End: 2020-10-07
Payer: MEDICAID

## 2020-10-07 VITALS
TEMPERATURE: 98.9 F | BODY MASS INDEX: 23.56 KG/M2 | WEIGHT: 138 LBS | SYSTOLIC BLOOD PRESSURE: 126 MMHG | HEART RATE: 87 BPM | HEIGHT: 64 IN | DIASTOLIC BLOOD PRESSURE: 60 MMHG

## 2020-10-07 DIAGNOSIS — Z91.89 AT HIGH RISK FOR BREAST CANCER: Primary | ICD-10-CM

## 2020-10-07 PROCEDURE — 99202 OFFICE O/P NEW SF 15 MIN: CPT | Performed by: SURGERY

## 2020-10-07 NOTE — LETTER
10/9/20 Patient: Ligia Figueroa YOB: 1955 Date of Visit: 10/7/2020 Lety Oakes [phone] [id number] VIA Facsimile: [phone] Dear Francis Serrato MD, Thank you for referring Ms. Lilli Parham to [address] [place] [address] for evaluation. My notes for this consultation are attached. If you have questions, please do not hesitate to call me. I look forward to following your patient along with you. Sincerely, Rafat Martínez MD

## 2020-10-07 NOTE — PATIENT INSTRUCTIONS

## 2020-10-07 NOTE — PROGRESS NOTES
HISTORY OF PRESENT ILLNESS  Josh Nguyen is a 62 y.o. female. HPI ESTABLISHED patient from 2015 here for bilateral breast pain. Breast history-  She has a history of ALH of RIGHT breast and had excisional biopsy in 2/2015. Completed 5 years of Letrozole, per Dr. Karen Corona, for risk reduction. She had no problems with this. BRCA test from 02/25/15 was negative. Breast imaging-  [facility] Results (most recent):  Results from Hospital Encounter encounter on 02/20/20   [facility] MAMMO BI SCREENING INCL CAD    Narrative STUDY: Bilateral digital screening mammogram    INDICATION:  Screening. COMPARISON: Prior studies dating back to 2012    BREAST COMPOSITION:  There are scattered areas of fibroglandular density. FINDINGS: Bilateral digital screening mammography was performed and is  interpreted in conjunction with a computer assisted detection (CAD) system. No  suspicious masses or calcifications are identified. There has been no  significant change. Impression IMPRESSION:  BI-RADS 1: Negative. No mammographic evidence of malignancy. RECOMMENDATIONS:  Next screening mammogram is recommended in one year. The patient will be notified of these results. Review of Systems   All other systems reviewed and are negative. Physical Exam  Chest:      Breasts: Breasts are symmetrical.         Right: Tenderness present. No inverted nipple, mass, nipple discharge or skin change. Left: Tenderness present. No inverted nipple, mass, nipple discharge or skin change. Lymphadenopathy:      Cervical: No cervical adenopathy. ASSESSMENT and PLAN    ICD-10-CM ICD-9-CM    1. At high risk for breast cancer  Z91.89 V49.89      - normal exam and imaging.    Yearly mammo and breast mri  F/u in 1 year with np

## 2021-03-17 ENCOUNTER — HOSPITAL ENCOUNTER (OUTPATIENT)
Dept: MAMMOGRAPHY | Age: 66
Discharge: HOME | End: 2021-03-17
Attending: INTERNAL MEDICINE
Payer: MEDICAID

## 2021-03-17 DIAGNOSIS — Z79.811 AROMATASE INHIBITOR USE: ICD-10-CM

## 2021-03-17 DIAGNOSIS — N60.99 ATYPICAL LOBULAR HYPERPLASIA OF BREAST: ICD-10-CM

## 2021-03-17 PROCEDURE — 77067 SCR MAMMO BI INCL CAD: CPT

## 2021-10-11 ENCOUNTER — OFFICE VISIT (OUTPATIENT)
Dept: SURGERY | Age: 66
End: 2021-10-11
Payer: MEDICAID

## 2021-10-11 VITALS
SYSTOLIC BLOOD PRESSURE: 134 MMHG | WEIGHT: 138 LBS | DIASTOLIC BLOOD PRESSURE: 77 MMHG | HEIGHT: 64 IN | HEART RATE: 106 BPM | BODY MASS INDEX: 23.56 KG/M2

## 2021-10-11 DIAGNOSIS — N60.12 FIBROCYSTIC BREAST CHANGES OF BOTH BREASTS: Primary | ICD-10-CM

## 2021-10-11 DIAGNOSIS — N60.11 FIBROCYSTIC BREAST CHANGES OF BOTH BREASTS: Primary | ICD-10-CM

## 2021-10-11 PROCEDURE — 99213 OFFICE O/P EST LOW 20 MIN: CPT | Performed by: NURSE PRACTITIONER

## 2021-10-11 NOTE — PATIENT INSTRUCTIONS

## 2021-10-11 NOTE — PROGRESS NOTES
HISTORY OF PRESENT ILLNESS  Isa Jaffe is a 60 y.o. female. HPI Established patient presents for follow-up to RIGHT breast ALH. Denies breast mass, skin changes, nipple discharge and pain. Breast history -  Referring - [facility]  She has a history of ALH of RIGHT breast and had excisional biopsy in 2015. Completed 5 years of Letrozole, per Dr. Zulma Alcantara, for risk reduction. Family history -   Mother  from breast cancer at age 40. No family history of ovarian cancer. Patient does not know her father's family history. BRCA test from 02/25/15 was negative. OB History        3    Para   3    Term                AB        Living           SAB        TAB        Ectopic        Molar        Multiple        Live Births              Obstetric Comments   Menarche:  16. LMP: 53.  # of Children:  3. Age at Delivery of First Child:  17.   Hysterectomy/oophorectomy:  NO/NO. Breast Bx:  no.  Hx of Breast Feeding:  no. BCP:  yes. Hormone therapy:  no.               Past Surgical History:   Procedure Laterality Date    HX BREAST BIOPSY Right 2015    ADH    HX GYN      TUBAL LIGATION     HX HEENT  1987    RECONSTRUCTION OF NOSE    HX HEENT      EXTRACTION OF WISDOM TEETH X 4         [facility] Results (most recent):  Results from Hospital Encounter encounter on 21    [facility] MAMMO BI SCREENING INCL CAD    Narrative  STUDY: Bilateral digital screening mammogram    INDICATION:  Screening. COMPARISON: Priors dating back to 2017    BREAST COMPOSITION:  There are scattered areas of fibroglandular density. FINDINGS: Bilateral digital screening mammography was performed and is  interpreted in conjunction with a computer assisted detection (CAD) system. No  suspicious masses or calcifications are identified. There has been no  significant change. Impression  BI-RADS 1: Negative. No mammographic evidence of malignancy.     RECOMMENDATIONS:  Next screening mammogram is recommended in one year. The patient will be notified of these results. ROS       Physical Exam  Constitutional:       Appearance: Normal appearance. Chest:      Breasts:         Right: No mass, nipple discharge, skin change or tenderness. Left: No mass, nipple discharge, skin change or tenderness. Musculoskeletal:      Comments: FROM - UE x 2   Lymphadenopathy:      Upper Body:      Right upper body: No supraclavicular or axillary adenopathy. Left upper body: No supraclavicular or axillary adenopathy. Neurological:      Mental Status: She is alert. Psychiatric:         Attention and Perception: Attention normal.         Mood and Affect: Mood normal.         Speech: Speech normal.         Behavior: Behavior normal.         Visit Vitals  /77 (BP 1 Location: Right arm, BP Patient Position: Sitting, BP Cuff Size: Adult)   Pulse (!) 106   Ht 5' 4\" (1.626 m)   Wt 138 lb (62.6 kg)   BMI 23.69 kg/m²         ASSESSMENT and PLAN    ICD-10-CM ICD-9-CM    1. Fibrocystic breast changes of both breasts  N60.11 610.1     N60.12        Normal exam with no evidence of breast malignancy. BSmammogram 3D in 3/2022. RTC in 1 year or sooner PRN. She is comfortable with this plan. All questions answered and she stated understanding. Total time spent for this patient - 20 minutes.

## 2022-02-21 ENCOUNTER — TRANSCRIBE ORDER (OUTPATIENT)
Dept: SCHEDULING | Age: 67
End: 2022-02-21

## 2022-02-21 DIAGNOSIS — Z12.31 SCREENING MAMMOGRAM FOR HIGH-RISK PATIENT: Primary | ICD-10-CM

## 2022-03-18 ENCOUNTER — HOSPITAL ENCOUNTER (OUTPATIENT)
Dept: MAMMOGRAPHY | Age: 67
Discharge: HOME | End: 2022-03-18
Attending: FAMILY MEDICINE
Payer: MEDICARE

## 2022-03-18 DIAGNOSIS — Z12.31 SCREENING MAMMOGRAM FOR HIGH-RISK PATIENT: ICD-10-CM

## 2022-03-18 PROBLEM — M85.80 OSTEOPENIA: Status: ACTIVE | Noted: 2019-05-14

## 2022-03-18 PROBLEM — M81.0 OSTEOPOROSIS: Status: ACTIVE | Noted: 2019-05-14

## 2022-03-18 PROCEDURE — 77067 SCR MAMMO BI INCL CAD: CPT

## 2022-06-14 ENCOUNTER — TRANSCRIBE ORDER (OUTPATIENT)
Dept: SCHEDULING | Age: 67
End: 2022-06-14

## 2022-06-14 DIAGNOSIS — M81.0 AGE RELATED OSTEOPOROSIS: Primary | ICD-10-CM

## 2022-07-06 ENCOUNTER — HOSPITAL ENCOUNTER (OUTPATIENT)
Dept: BONE DENSITY | Age: 67
Discharge: HOME | End: 2022-07-06
Attending: PHYSICIAN ASSISTANT
Payer: MEDICARE

## 2022-07-06 DIAGNOSIS — M81.0 AGE RELATED OSTEOPOROSIS: ICD-10-CM

## 2022-07-06 PROCEDURE — 77080 DXA BONE DENSITY AXIAL: CPT

## 2022-10-31 ENCOUNTER — OFFICE VISIT (OUTPATIENT)
Dept: SURGERY | Age: 67
End: 2022-10-31
Payer: MEDICARE

## 2022-10-31 VITALS
WEIGHT: 145 LBS | DIASTOLIC BLOOD PRESSURE: 71 MMHG | HEIGHT: 64 IN | HEART RATE: 101 BPM | BODY MASS INDEX: 24.75 KG/M2 | SYSTOLIC BLOOD PRESSURE: 144 MMHG

## 2022-10-31 DIAGNOSIS — N60.11 FIBROCYSTIC BREAST CHANGES OF BOTH BREASTS: Primary | ICD-10-CM

## 2022-10-31 DIAGNOSIS — N60.12 FIBROCYSTIC BREAST CHANGES OF BOTH BREASTS: Primary | ICD-10-CM

## 2022-10-31 DIAGNOSIS — Z12.31 ENCOUNTER FOR SCREENING MAMMOGRAM FOR BREAST CANCER: ICD-10-CM

## 2022-10-31 PROCEDURE — G8427 DOCREV CUR MEDS BY ELIG CLIN: HCPCS | Performed by: NURSE PRACTITIONER

## 2022-10-31 PROCEDURE — G8432 DEP SCR NOT DOC, RNG: HCPCS | Performed by: NURSE PRACTITIONER

## 2022-10-31 PROCEDURE — 99213 OFFICE O/P EST LOW 20 MIN: CPT | Performed by: NURSE PRACTITIONER

## 2022-10-31 PROCEDURE — G8420 CALC BMI NORM PARAMETERS: HCPCS | Performed by: NURSE PRACTITIONER

## 2022-10-31 PROCEDURE — G8536 NO DOC ELDER MAL SCRN: HCPCS | Performed by: NURSE PRACTITIONER

## 2022-10-31 PROCEDURE — G9899 SCRN MAM PERF RSLTS DOC: HCPCS | Performed by: NURSE PRACTITIONER

## 2022-10-31 PROCEDURE — 1090F PRES/ABSN URINE INCON ASSESS: CPT | Performed by: NURSE PRACTITIONER

## 2022-10-31 PROCEDURE — 1101F PT FALLS ASSESS-DOCD LE1/YR: CPT | Performed by: NURSE PRACTITIONER

## 2022-10-31 PROCEDURE — 1123F ACP DISCUSS/DSCN MKR DOCD: CPT | Performed by: NURSE PRACTITIONER

## 2022-10-31 PROCEDURE — 3017F COLORECTAL CA SCREEN DOC REV: CPT | Performed by: NURSE PRACTITIONER

## 2022-10-31 NOTE — PROGRESS NOTES
HISTORY OF PRESENT ILLNESS  Bulmaro Hudson is a 73 y.o. female. HPI Established patient presents for follow-up to RIGHT breast ALH. Denies breast mass, skin changes, nipple discharge and pain. Breast history -  Referring - EWL  She has a history of ALH of RIGHT breast and had excisional biopsy in 2015. Completed 5 years of Letrozole, per Dr. Jeet Figueroa, for risk reduction. Family history -   Mother  from breast cancer at age 40. No family history of ovarian cancer. Patient does not know her father's family history. BRCA test from 02/25/15 was negative. OB History          3    Para   3    Term                AB        Living             SAB        IAB        Ectopic        Molar        Multiple        Live Births   3          Obstetric Comments   Menarche:  16. LMP: 53.  # of Children:  3. Age at Delivery of First Child:  17.   Hysterectomy/oophorectomy:  NO/NO. Breast Bx:  no.  Hx of Breast Feeding:  no. BCP:  yes. Hormone therapy:  no.                 Past Surgical History:   Procedure Laterality Date    HX BREAST BIOPSY Right 2015    ADH    HX GYN      TUBAL LIGATION     HX HEENT  1987    RECONSTRUCTION OF NOSE    HX HEENT      EXTRACTION OF WISDOM TEETH X 4         [facility] Results (most recent):  Results from Hospital Encounter encounter on 22    [facility] MAMMO BI SCREENING INCL CAD    Narrative  STUDY: Bilateral digital screening mammogram    INDICATION:  Screening. COMPARISON: Prior studies dating back to     BREAST COMPOSITION:  There are scattered areas of fibroglandular density. FINDINGS: Bilateral digital screening mammography was performed and is  interpreted in conjunction with a computer assisted detection (CAD) system. No  suspicious masses or calcifications are identified. There has been no  significant change. Impression  BI-RADS 1: Negative. No mammographic evidence of malignancy.     RECOMMENDATIONS:  Next screening mammogram is recommended in one year. The patient will be notified of these results. ROS    Physical Exam  Constitutional:       Appearance: Normal appearance. Chest:   Breasts:     Right: No mass, nipple discharge, skin change or tenderness. Left: No mass, nipple discharge, skin change or tenderness. Musculoskeletal:      Comments: FROM - UE x 2   Lymphadenopathy:      Upper Body:      Right upper body: No supraclavicular or axillary adenopathy. Left upper body: No supraclavicular or axillary adenopathy. Neurological:      Mental Status: She is alert. Psychiatric:         Attention and Perception: Attention normal.         Mood and Affect: Mood normal.         Speech: Speech normal.         Behavior: Behavior normal.         Visit Vitals  BP (!) 144/71 (BP 1 Location: Right arm, BP Patient Position: Sitting, BP Cuff Size: Small adult)   Pulse (!) 101   Ht 5' 4\" (1.626 m)   Wt 145 lb (65.8 kg)   LMP  (LMP Unknown)   BMI 24.89 kg/m²       ASSESSMENT and PLAN    ICD-10-CM ICD-9-CM    1. Fibrocystic breast changes of both breasts  N60.11 610.1     N60.12        2. Encounter for screening mammogram for breast cancer  Z12.31 V76.12 [facility] 3D [facility] MAMMO BI SCREENING INCL CAD          Normal exam with no evidence of breast malignancy. BSmammogram 3D in 3/2023. RTC in 1 year or sooner PRN. She is comfortable with this plan. All questions answered and she stated understanding. Total time spent for this patient - 20 minutes.

## 2023-03-20 ENCOUNTER — HOSPITAL ENCOUNTER (OUTPATIENT)
Dept: MAMMOGRAPHY | Age: 68
Discharge: HOME | End: 2023-03-20
Attending: NURSE PRACTITIONER
Payer: MEDICARE

## 2023-03-20 DIAGNOSIS — Z12.31 ENCOUNTER FOR SCREENING MAMMOGRAM FOR BREAST CANCER: ICD-10-CM

## 2023-03-20 PROCEDURE — 77063 BREAST TOMOSYNTHESIS BI: CPT

## 2023-03-27 ENCOUNTER — OFFICE VISIT (OUTPATIENT)
Dept: SURGERY | Age: 68
End: 2023-03-27
Payer: MEDICARE

## 2023-03-27 DIAGNOSIS — Z12.31 ENCOUNTER FOR SCREENING MAMMOGRAM FOR BREAST CANCER: ICD-10-CM

## 2023-03-27 DIAGNOSIS — N60.11 FIBROCYSTIC BREAST CHANGES OF BOTH BREASTS: Primary | ICD-10-CM

## 2023-03-27 DIAGNOSIS — N60.12 FIBROCYSTIC BREAST CHANGES OF BOTH BREASTS: Primary | ICD-10-CM

## 2023-03-27 PROCEDURE — 1123F ACP DISCUSS/DSCN MKR DOCD: CPT | Performed by: NURSE PRACTITIONER

## 2023-03-27 PROCEDURE — 1090F PRES/ABSN URINE INCON ASSESS: CPT | Performed by: NURSE PRACTITIONER

## 2023-03-27 PROCEDURE — G9899 SCRN MAM PERF RSLTS DOC: HCPCS | Performed by: NURSE PRACTITIONER

## 2023-03-27 PROCEDURE — 99213 OFFICE O/P EST LOW 20 MIN: CPT | Performed by: NURSE PRACTITIONER

## 2023-03-27 PROCEDURE — G8432 DEP SCR NOT DOC, RNG: HCPCS | Performed by: NURSE PRACTITIONER

## 2023-03-27 PROCEDURE — 1101F PT FALLS ASSESS-DOCD LE1/YR: CPT | Performed by: NURSE PRACTITIONER

## 2023-03-27 PROCEDURE — G8536 NO DOC ELDER MAL SCRN: HCPCS | Performed by: NURSE PRACTITIONER

## 2023-03-27 PROCEDURE — G8427 DOCREV CUR MEDS BY ELIG CLIN: HCPCS | Performed by: NURSE PRACTITIONER

## 2023-03-27 PROCEDURE — 3017F COLORECTAL CA SCREEN DOC REV: CPT | Performed by: NURSE PRACTITIONER

## 2023-03-27 PROCEDURE — G8420 CALC BMI NORM PARAMETERS: HCPCS | Performed by: NURSE PRACTITIONER

## 2023-03-27 RX ORDER — ATORVASTATIN CALCIUM 10 MG/1
TABLET, FILM COATED ORAL
COMMUNITY
Start: 2023-01-26

## 2023-03-27 NOTE — PROGRESS NOTES
HISTORY OF PRESENT ILLNESS  Isac Longoria is a 78 y.o. female. HPI Established patient presents for follow-up to RIGHT breast ALH. Denies breast mass, skin changes, nipple discharge and pain. Breast history -  Referring - EWL  She has a history of ALH of RIGHT breast and had excisional biopsy in 2015. Completed 5 years of Letrozole, per Dr. Apollo Ryan, for risk reduction. Family history -   Mother  from breast cancer at age 40. No family history of ovarian cancer. Patient does not know her father's family history. BRCA test from 02/25/15 was negative. OB History          3    Para   3    Term                AB        Living             SAB        IAB        Ectopic        Molar        Multiple        Live Births   3          Obstetric Comments   Menarche:  16. LMP: 53.  # of Children:  3. Age at Delivery of First Child:  17.   Hysterectomy/oophorectomy:  NO/NO. Breast Bx:  no.  Hx of Breast Feeding:  no. BCP:  yes. Hormone therapy:  no.               Past Surgical History:   Procedure Laterality Date    HX BREAST BIOPSY Right 2015    ADH    HX GYN      TUBAL LIGATION     HX HEENT  1987    RECONSTRUCTION OF NOSE    HX HEENT      EXTRACTION OF WISDOM TEETH X 4         [facility] Results (most recent):  Results from Hospital Encounter encounter on 23    [facility] 3D [facility] MAMMO BI SCREENING INCL CAD    Narrative  STUDY: Bilateral digital screening mammogram with 3-D tomosynthesis    INDICATION:  Screening. COMPARISON: Prior studies dating back to     BREAST COMPOSITION: There are scattered areas of fibroglandular density. FINDINGS: Bilateral digital screening mammography was performed and is  interpreted in conjunction with a computer assisted detection (CAD) system. Additionally, tomosynthesis of both breasts in the CC and MLO projections was  performed. No suspicious masses or calcifications are identified.  There has been  no significant change. Impression  BI-RADS 1: Negative. No mammographic evidence of malignancy. RECOMMENDATIONS:  Next screening mammogram is recommended in one year. The patient will be notified of these results. ROS    Physical Exam  Constitutional:       Appearance: Normal appearance. Chest:   Breasts:     Right: No mass, nipple discharge, skin change or tenderness. Left: No mass, nipple discharge, skin change or tenderness. Musculoskeletal:      Comments: FROM - UE x 2   Lymphadenopathy:      Upper Body:      Right upper body: No supraclavicular or axillary adenopathy. Left upper body: No supraclavicular or axillary adenopathy. Neurological:      Mental Status: She is alert. Psychiatric:         Attention and Perception: Attention normal.         Mood and Affect: Mood normal.         Speech: Speech normal.         Behavior: Behavior normal.       ASSESSMENT and PLAN    ICD-10-CM ICD-9-CM    1. Fibrocystic breast changes of both breasts  N60.11 610.1     N60.12        2. Encounter for screening mammogram for breast cancer  Z12.31 V76.12 [facility] 3D [facility] MAMMO BI SCREENING INCL CAD          Normal exam with no evidence of breast malignancy. BSmammogram 3D in 3/2024. RTC in 1 year or sooner PRN. She is comfortable with this plan. All questions answered and she stated understanding. Total time spent for this patient - 20 minutes.

## 2023-04-24 DIAGNOSIS — Z12.31 ENCOUNTER FOR SCREENING MAMMOGRAM FOR BREAST CANCER: Primary | ICD-10-CM

## 2024-03-22 ENCOUNTER — HOSPITAL ENCOUNTER (OUTPATIENT)
Facility: HOSPITAL | Age: 69
End: 2024-03-22
Payer: MEDICARE

## 2024-03-22 VITALS — WEIGHT: 130 LBS | BODY MASS INDEX: 22.2 KG/M2 | HEIGHT: 64 IN

## 2024-03-22 DIAGNOSIS — Z12.31 VISIT FOR SCREENING MAMMOGRAM: ICD-10-CM

## 2024-03-22 PROCEDURE — 77063 BREAST TOMOSYNTHESIS BI: CPT

## 2024-04-23 ENCOUNTER — HOSPITAL ENCOUNTER (OUTPATIENT)
Facility: HOSPITAL | Age: 69
Discharge: HOME | End: 2024-04-26
Attending: FAMILY MEDICINE
Payer: MEDICARE

## 2024-04-23 DIAGNOSIS — R92.8 ABNORMAL MAMMOGRAM OF RIGHT BREAST: ICD-10-CM

## 2024-04-23 PROCEDURE — G0279 TOMOSYNTHESIS, MAMMO: HCPCS

## 2024-04-23 PROCEDURE — 76642 ULTRASOUND BREAST LIMITED: CPT

## 2024-10-08 ENCOUNTER — APPOINTMENT (OUTPATIENT)
Facility: HOSPITAL | Age: 69
End: 2024-10-08
Payer: MEDICARE

## 2024-10-08 ENCOUNTER — HOSPITAL ENCOUNTER (EMERGENCY)
Facility: HOSPITAL | Age: 69
Discharge: HOME | End: 2024-10-09
Attending: EMERGENCY MEDICINE
Payer: MEDICARE

## 2024-10-08 DIAGNOSIS — S99.919A ANKLE INJURY, INITIAL ENCOUNTER: Primary | ICD-10-CM

## 2024-10-08 PROCEDURE — 73610 X-RAY EXAM OF ANKLE: CPT

## 2024-10-08 PROCEDURE — 6370000000 HC RX 637 (ALT 250 FOR IP): Performed by: EMERGENCY MEDICINE

## 2024-10-08 PROCEDURE — 99283 EMERGENCY DEPT VISIT LOW MDM: CPT

## 2024-10-08 RX ORDER — DICLOFENAC SODIUM 75 MG/1
75 TABLET, DELAYED RELEASE ORAL 2 TIMES DAILY
Qty: 20 TABLET | Refills: 0 | Status: SHIPPED | OUTPATIENT
Start: 2024-10-08 | End: 2024-10-18

## 2024-10-08 RX ORDER — HYDROCODONE BITARTRATE AND ACETAMINOPHEN 5; 325 MG/1; MG/1
1 TABLET ORAL
Status: COMPLETED | OUTPATIENT
Start: 2024-10-08 | End: 2024-10-08

## 2024-10-08 RX ADMIN — HYDROCODONE BITARTRATE AND ACETAMINOPHEN 1 TABLET: 5; 325 TABLET ORAL at 22:13

## 2024-10-08 ASSESSMENT — PAIN DESCRIPTION - ORIENTATION: ORIENTATION: LEFT

## 2024-10-08 ASSESSMENT — PAIN DESCRIPTION - LOCATION: LOCATION: ANKLE

## 2024-10-08 ASSESSMENT — PAIN SCALES - GENERAL: PAINLEVEL_OUTOF10: 5

## 2024-10-09 VITALS
HEIGHT: 66 IN | OXYGEN SATURATION: 92 % | BODY MASS INDEX: 24.94 KG/M2 | RESPIRATION RATE: 16 BRPM | HEART RATE: 77 BPM | TEMPERATURE: 97.5 F | SYSTOLIC BLOOD PRESSURE: 140 MMHG | DIASTOLIC BLOOD PRESSURE: 81 MMHG | WEIGHT: 155.2 LBS

## 2024-10-09 NOTE — ED NOTES
Patient discharged by provider, discharge instructions provided to patient and reviewed, all questions answered. Vital signs stable, A/Ox4, breathing unlabored. Patient ambulatory on discharge w/ walker boot applied

## 2024-10-09 NOTE — DISCHARGE INSTRUCTIONS
You are seen in the emergency department for your symptoms.  Please follow-up with your primary care doctor and orthopedic doctor.  You may need a repeat x-ray in 7 days.  Please use the boot for comfort.  Use pain medications and keep elevated with ice.  Return for any worsening symptoms anytime.

## 2024-10-21 ENCOUNTER — HOSPITAL ENCOUNTER (EMERGENCY)
Facility: HOSPITAL | Age: 69
Discharge: HOME | End: 2024-10-21
Attending: STUDENT IN AN ORGANIZED HEALTH CARE EDUCATION/TRAINING PROGRAM
Payer: MEDICARE

## 2024-10-21 ENCOUNTER — APPOINTMENT (OUTPATIENT)
Facility: HOSPITAL | Age: 69
End: 2024-10-21
Payer: MEDICARE

## 2024-10-21 VITALS
RESPIRATION RATE: 20 BRPM | HEART RATE: 88 BPM | DIASTOLIC BLOOD PRESSURE: 74 MMHG | BODY MASS INDEX: 24.87 KG/M2 | WEIGHT: 154.76 LBS | OXYGEN SATURATION: 94 % | HEIGHT: 66 IN | SYSTOLIC BLOOD PRESSURE: 109 MMHG | TEMPERATURE: 98.7 F

## 2024-10-21 DIAGNOSIS — S63.650A SPRAIN OF METACARPOPHALANGEAL (MCP) JOINT OF RIGHT INDEX FINGER, INITIAL ENCOUNTER: Primary | ICD-10-CM

## 2024-10-21 PROCEDURE — 6370000000 HC RX 637 (ALT 250 FOR IP): Performed by: STUDENT IN AN ORGANIZED HEALTH CARE EDUCATION/TRAINING PROGRAM

## 2024-10-21 PROCEDURE — 73130 X-RAY EXAM OF HAND: CPT

## 2024-10-21 PROCEDURE — 99283 EMERGENCY DEPT VISIT LOW MDM: CPT

## 2024-10-21 RX ORDER — IBUPROFEN 600 MG/1
600 TABLET, FILM COATED ORAL
Status: COMPLETED | OUTPATIENT
Start: 2024-10-21 | End: 2024-10-21

## 2024-10-21 RX ADMIN — IBUPROFEN 600 MG: 600 TABLET, FILM COATED ORAL at 19:25

## 2024-10-21 ASSESSMENT — PAIN SCALES - GENERAL
PAINLEVEL_OUTOF10: 7
PAINLEVEL_OUTOF10: 8

## 2024-10-21 ASSESSMENT — PAIN DESCRIPTION - LOCATION
LOCATION: ARM
LOCATION: FINGER (COMMENT WHICH ONE)

## 2024-10-21 ASSESSMENT — PAIN DESCRIPTION - DESCRIPTORS
DESCRIPTORS: THROBBING
DESCRIPTORS: ACHING

## 2024-10-21 ASSESSMENT — PAIN DESCRIPTION - ORIENTATION
ORIENTATION: RIGHT
ORIENTATION: RIGHT

## 2024-10-21 NOTE — ED PROVIDER NOTES
[facility] EMERGENCY DEPT  EMERGENCY DEPARTMENT ENCOUNTER       Pt Name: Leslie Peng  MRN: [id number]  Birthdate 1955  Date of evaluation: 10/21/2024  Provider: Alexander Mon MD   PCP: Kehinde Ma MD  Note Started: 7:17 PM EDT 10/21/24     CHIEF COMPLAINT       Chief Complaint   Patient presents with    Hand Injury     Ambulatory c/o R 2nd digit injury. States she was pulling clothes out of the washing machine and felt the finger \"pull out.\"        HISTORY OF PRESENT ILLNESS: 1 or more elements      History From: patient, History limited by: none     Leslie Peng is a 68 y.o. female presenting with right finger pain.  Notes she was getting clothes out of the washer and the jeans pulled her finer out.  She is able to tuse the finger but notes it hurts at the MCP joint, 2nd digit.  Denies numbness or tingling.  No meds taken.       Please See MDM for Additional Details of the HPI/PMH  Nursing Notes were all reviewed and agreed with or any disagreements were addressed in the HPI.     REVIEW OF SYSTEMS        Positives and Pertinent negatives as per HPI.    PAST HISTORY     Past Medical History:  Past Medical History:   Diagnosis Date    Atypical lobular hyperplasia of breast     right breast    Cancer (HCC)     R 2013, L     Diabetes (HCC)     BORDERLINE BEING MONITORED BY PCP    Hypertension     Nausea & vomiting        Past Surgical History:  Past Surgical History:   Procedure Laterality Date    BREAST BIOPSY Right 2015    ADH    GYN      TUBAL LIGATION     HEENT      EXTRACTION OF WISDOM TEETH X 4    HEENT  1987    RECONSTRUCTION OF NOSE       Family History:  Family History   Problem Relation Age of Onset    Cancer Mother 40        BREAST    Breast Cancer Mother 40         at 44    Heart Disease Father     No Known Problems Sister     No Known Problems Brother     No Known Problems Daughter     No Known Problems Son     Breast Cancer Maternal Aunt        Social

## 2024-10-21 NOTE — ED NOTES
Report received from THREON Harmon. Reviewed reason for patient arrival, vitals, labs, medications, orders, procedures, results, pending orders/results and current plan for disposition. Questions were asked and answered prior to departure.

## 2024-10-22 NOTE — ED NOTES
Patient discharged from the ED by MD Elieser. Diagnosis, medications, precautions and follow-ups were reviewed with the patient/family. Questions were asked and answered prior to departure. Patient departed the ED via walking and was accompanied by self to car home.

## 2024-12-16 ENCOUNTER — HOSPITAL ENCOUNTER (OUTPATIENT)
Facility: HOSPITAL | Age: 69
Discharge: HOME | End: 2024-12-19
Payer: MEDICARE

## 2024-12-16 DIAGNOSIS — E28.39 OTHER PRIMARY OVARIAN FAILURE: ICD-10-CM

## 2024-12-16 PROCEDURE — 77080 DXA BONE DENSITY AXIAL: CPT

## 2025-02-09 ENCOUNTER — APPOINTMENT (OUTPATIENT)
Facility: HOSPITAL | Age: 70
End: 2025-02-09
Payer: MEDICARE

## 2025-02-09 ENCOUNTER — HOSPITAL ENCOUNTER (EMERGENCY)
Facility: HOSPITAL | Age: 70
Discharge: HOME | End: 2025-02-09
Payer: MEDICARE

## 2025-02-09 VITALS
TEMPERATURE: 98.1 F | RESPIRATION RATE: 16 BRPM | DIASTOLIC BLOOD PRESSURE: 79 MMHG | SYSTOLIC BLOOD PRESSURE: 147 MMHG | HEART RATE: 78 BPM | OXYGEN SATURATION: 97 %

## 2025-02-09 DIAGNOSIS — S69.91XA INJURY OF RIGHT THUMB, INITIAL ENCOUNTER: ICD-10-CM

## 2025-02-09 DIAGNOSIS — S09.90XA INJURY OF HEAD, INITIAL ENCOUNTER: Primary | ICD-10-CM

## 2025-02-09 DIAGNOSIS — S01.111A LACERATION OF RIGHT EYEBROW, INITIAL ENCOUNTER: ICD-10-CM

## 2025-02-09 DIAGNOSIS — S80.01XA CONTUSION OF RIGHT KNEE, INITIAL ENCOUNTER: ICD-10-CM

## 2025-02-09 PROCEDURE — 6370000000 HC RX 637 (ALT 250 FOR IP): Performed by: PHYSICIAN ASSISTANT

## 2025-02-09 PROCEDURE — 70450 CT HEAD/BRAIN W/O DYE: CPT

## 2025-02-09 PROCEDURE — 99284 EMERGENCY DEPT VISIT MOD MDM: CPT

## 2025-02-09 PROCEDURE — 73130 X-RAY EXAM OF HAND: CPT

## 2025-02-09 PROCEDURE — 73562 X-RAY EXAM OF KNEE 3: CPT

## 2025-02-09 PROCEDURE — 12011 RPR F/E/E/N/L/M 2.5 CM/<: CPT

## 2025-02-09 RX ORDER — GINSENG 100 MG
CAPSULE ORAL
Status: COMPLETED | OUTPATIENT
Start: 2025-02-09 | End: 2025-02-09

## 2025-02-09 RX ORDER — HYDROCODONE BITARTRATE AND ACETAMINOPHEN 5; 325 MG/1; MG/1
1 TABLET ORAL
Status: COMPLETED | OUTPATIENT
Start: 2025-02-09 | End: 2025-02-09

## 2025-02-09 RX ADMIN — HYDROCODONE BITARTRATE AND ACETAMINOPHEN 1 TABLET: 5; 325 TABLET ORAL at 18:45

## 2025-02-09 RX ADMIN — BACITRACIN 1 EACH: 500 OINTMENT TOPICAL at 18:29

## 2025-02-09 ASSESSMENT — PAIN - FUNCTIONAL ASSESSMENT: PAIN_FUNCTIONAL_ASSESSMENT: 0-10

## 2025-02-09 ASSESSMENT — LIFESTYLE VARIABLES
HOW OFTEN DO YOU HAVE A DRINK CONTAINING ALCOHOL: 2-4 TIMES A MONTH
HOW MANY STANDARD DRINKS CONTAINING ALCOHOL DO YOU HAVE ON A TYPICAL DAY: 1 OR 2

## 2025-02-09 ASSESSMENT — PAIN DESCRIPTION - DESCRIPTORS: DESCRIPTORS: THROBBING

## 2025-02-09 ASSESSMENT — PAIN DESCRIPTION - ORIENTATION: ORIENTATION: RIGHT

## 2025-02-09 ASSESSMENT — PAIN SCALES - GENERAL: PAINLEVEL_OUTOF10: 3

## 2025-02-09 ASSESSMENT — PAIN DESCRIPTION - LOCATION: LOCATION: HAND

## 2025-02-09 NOTE — ED PROVIDER NOTES
Neurological:      Mental Status: She is alert.          DIAGNOSTIC RESULTS   LABS:     No results found for this or any previous visit (from the past 12 hour(s)).    EKG: When ordered, EKG's are interpreted by the Emergency Department Physician in the absence of a cardiologist.  Please see their note for interpretation of EKG.      RADIOLOGY:  Non-plain film images such as CT, Ultrasound and MRI are read by the radiologist. Plain radiographic images are visualized and preliminarily interpreted by the ED Provider with the below findings:       Interpretation per the Radiologist below, if available at the time of this note:     XR KNEE RIGHT (3 VIEWS)   Final Result   Prominent soft tissue swelling without acute osseous abnormality.      Electronically signed by Armin Toney      XR HAND RIGHT (MIN 3 VIEWS)   Final Result   No findings of an acute osseous abnormality within the limitation of osteopenia.      Electronically signed by Armin Toney      CT HEAD WO CONTRAST   Final Result   No acute intracranial process identified            Electronically signed by Sisi Fowler           PROCEDURES   Unless otherwise noted below, none  Lac Repair    Date/Time: 2/9/2025 6:20 PM    Performed by: Samia Ordonez PA-C  Authorized by: Samia Ordonez PA-C    Consent:     Consent obtained:  Verbal    Consent given by:  Patient    Risks discussed:  Infection and pain  Laceration details:     Location:  Face    Face location:  R eyebrow    Length (cm):  1.5  Treatment:     Area cleansed with:  Povidone-iodine  Skin repair:     Repair method:  Sutures    Suture size:  5-0    Suture material:  Fast-absorbing gut    Suture technique:  Simple interrupted    Number of sutures:  3  Approximation:     Approximation:  Close  Repair type:     Repair type:  Simple  Post-procedure details:     Dressing:  Open (no dressing)    Procedure completion:  Tolerated       CRITICAL CARE TIME       EMERGENCY DEPARTMENT COURSE and

## 2025-02-10 NOTE — ED NOTES
Discharge instructions have been discussed with patient. Opportunity for questions and further education has been provided to patient. Patient expressed understanding. Patient  understand to seek Emergency Medical attention for chest pain or new or worsening symptoms.

## 2025-02-27 ENCOUNTER — TRANSCRIBE ORDERS (OUTPATIENT)
Facility: HOSPITAL | Age: 70
End: 2025-02-27

## 2025-02-27 DIAGNOSIS — Z12.31 OTHER SCREENING MAMMOGRAM: Primary | ICD-10-CM

## 2025-04-04 ENCOUNTER — HOSPITAL ENCOUNTER (OUTPATIENT)
Facility: HOSPITAL | Age: 70
Discharge: HOME | End: 2025-04-04
Attending: FAMILY MEDICINE
Payer: MEDICARE

## 2025-04-04 DIAGNOSIS — Z12.31 OTHER SCREENING MAMMOGRAM: ICD-10-CM

## 2025-04-04 PROCEDURE — 77063 BREAST TOMOSYNTHESIS BI: CPT

## 2025-07-21 ENCOUNTER — TRANSCRIBE ORDERS (OUTPATIENT)
Facility: HOSPITAL | Age: 70
End: 2025-07-21

## 2025-07-21 DIAGNOSIS — Z87.891 PERSONAL HISTORY OF NICOTINE DEPENDENCE: Primary | ICD-10-CM

## 2025-07-29 ENCOUNTER — HOSPITAL ENCOUNTER (OUTPATIENT)
Facility: HOSPITAL | Age: 70
Discharge: HOME | End: 2025-08-01
Payer: MEDICARE

## 2025-07-29 DIAGNOSIS — Z87.891 PERSONAL HISTORY OF NICOTINE DEPENDENCE: ICD-10-CM

## 2025-07-29 PROCEDURE — 71271 CT THORAX LUNG CANCER SCR C-: CPT

## 2025-08-20 ENCOUNTER — TRANSCRIBE ORDERS (OUTPATIENT)
Facility: HOSPITAL | Age: 70
End: 2025-08-20

## 2025-08-20 DIAGNOSIS — K44.9 HIATAL HERNIA: Primary | ICD-10-CM

## 2025-08-29 ENCOUNTER — HOSPITAL ENCOUNTER (OUTPATIENT)
Facility: HOSPITAL | Age: 70
Discharge: HOME | End: 2025-09-01
Attending: INTERNAL MEDICINE
Payer: MEDICARE

## 2025-08-29 DIAGNOSIS — K44.9 HIATAL HERNIA: ICD-10-CM

## 2025-08-29 PROCEDURE — 74221 X-RAY XM ESOPHAGUS 2CNTRST: CPT
